# Patient Record
Sex: FEMALE | Race: OTHER | Employment: OTHER | URBAN - METROPOLITAN AREA
[De-identification: names, ages, dates, MRNs, and addresses within clinical notes are randomized per-mention and may not be internally consistent; named-entity substitution may affect disease eponyms.]

---

## 2018-05-29 NOTE — PATIENT DISCUSSION
This visual field clearly demonstrated a minimum of 50% loss of upper field of vision OU, with upper lid skin in repose and elevated by taping of the lid to demonstrate potential correction. This field shows that taping the lids significantly improved this patient's superior field of vision by approximately 50%, OU.

## 2018-09-12 NOTE — PATIENT DISCUSSION
Last visit: 2/20/17  Last refill Norco: 2/20/17 #240  Oxycodone: 2/20/17 #30  Alprazolam: 2/20/17 #120  Refill set up below   Level and contour excellent.

## 2021-12-29 ENCOUNTER — NEW PATIENT (OUTPATIENT)
Dept: URBAN - METROPOLITAN AREA CLINIC 43 | Facility: CLINIC | Age: 61
End: 2021-12-29

## 2021-12-29 DIAGNOSIS — H04.123: ICD-10-CM

## 2021-12-29 DIAGNOSIS — H52.202: ICD-10-CM

## 2021-12-29 DIAGNOSIS — H52.4: ICD-10-CM

## 2021-12-29 DIAGNOSIS — H52.03: ICD-10-CM

## 2021-12-29 PROCEDURE — 92015 DETERMINE REFRACTIVE STATE: CPT

## 2021-12-29 PROCEDURE — 92004 COMPRE OPH EXAM NEW PT 1/>: CPT

## 2021-12-29 ASSESSMENT — TONOMETRY
OS_IOP_MMHG: 14
OD_IOP_MMHG: 12

## 2021-12-29 ASSESSMENT — VISUAL ACUITY
OS_CC: J2
OS_SC: J12
OD_SC: 20/60
OS_SC: 20/50
OD_SC: J12
OD_CC: J3

## 2023-04-24 ENCOUNTER — EMERGENCY VISIT (OUTPATIENT)
Dept: URBAN - METROPOLITAN AREA CLINIC 43 | Facility: CLINIC | Age: 63
End: 2023-04-24

## 2023-04-24 DIAGNOSIS — H11.442: ICD-10-CM

## 2023-04-24 DIAGNOSIS — H11.32: ICD-10-CM

## 2023-04-24 DIAGNOSIS — H04.123: ICD-10-CM

## 2023-04-24 DIAGNOSIS — H25.9: ICD-10-CM

## 2023-04-24 PROCEDURE — 92012 INTRM OPH EXAM EST PATIENT: CPT

## 2023-04-24 ASSESSMENT — VISUAL ACUITY
OD_CC: 20/20
OS_CC: 20/20

## 2023-12-18 ENCOUNTER — COMPREHENSIVE EXAM (OUTPATIENT)
Dept: URBAN - METROPOLITAN AREA CLINIC 43 | Facility: CLINIC | Age: 63
End: 2023-12-18

## 2023-12-18 DIAGNOSIS — H04.123: ICD-10-CM

## 2023-12-18 DIAGNOSIS — H52.202: ICD-10-CM

## 2023-12-18 DIAGNOSIS — H52.4: ICD-10-CM

## 2023-12-18 DIAGNOSIS — H52.03: ICD-10-CM

## 2023-12-18 DIAGNOSIS — H11.442: ICD-10-CM

## 2023-12-18 DIAGNOSIS — H25.9: ICD-10-CM

## 2023-12-18 PROCEDURE — 92014 COMPRE OPH EXAM EST PT 1/>: CPT

## 2023-12-18 PROCEDURE — 92015 DETERMINE REFRACTIVE STATE: CPT

## 2023-12-18 ASSESSMENT — VISUAL ACUITY
OS_SC: 20/40-1
OD_SC: 20/70
OS_CC: 20/20
OD_CC: 20/20
OD_CC: J1
OS_CC: J1

## 2023-12-18 ASSESSMENT — TONOMETRY
OD_IOP_MMHG: 14
OS_IOP_MMHG: 14

## 2024-12-19 ENCOUNTER — COMPREHENSIVE EXAM (OUTPATIENT)
Age: 64
End: 2024-12-19

## 2024-12-19 DIAGNOSIS — H52.03: ICD-10-CM

## 2024-12-19 DIAGNOSIS — H25.9: ICD-10-CM

## 2024-12-19 DIAGNOSIS — H52.4: ICD-10-CM

## 2024-12-19 DIAGNOSIS — H04.123: ICD-10-CM

## 2024-12-19 DIAGNOSIS — H11.442: ICD-10-CM

## 2024-12-19 DIAGNOSIS — H52.202: ICD-10-CM

## 2024-12-19 PROCEDURE — 92015 DETERMINE REFRACTIVE STATE: CPT

## 2024-12-19 PROCEDURE — 92014 COMPRE OPH EXAM EST PT 1/>: CPT
